# Patient Record
Sex: MALE | Race: WHITE | NOT HISPANIC OR LATINO
[De-identification: names, ages, dates, MRNs, and addresses within clinical notes are randomized per-mention and may not be internally consistent; named-entity substitution may affect disease eponyms.]

---

## 2017-01-17 ENCOUNTER — APPOINTMENT (OUTPATIENT)
Dept: SURGICAL ONCOLOGY | Facility: CLINIC | Age: 67
End: 2017-01-17

## 2017-01-17 VITALS
DIASTOLIC BLOOD PRESSURE: 80 MMHG | RESPIRATION RATE: 18 BRPM | SYSTOLIC BLOOD PRESSURE: 120 MMHG | BODY MASS INDEX: 24.59 KG/M2 | WEIGHT: 166 LBS | HEIGHT: 69 IN | HEART RATE: 90 BPM

## 2018-04-19 ENCOUNTER — APPOINTMENT (OUTPATIENT)
Dept: CARDIOLOGY | Facility: CLINIC | Age: 68
End: 2018-04-19
Payer: COMMERCIAL

## 2018-04-19 VITALS
SYSTOLIC BLOOD PRESSURE: 122 MMHG | HEART RATE: 62 BPM | BODY MASS INDEX: 23.85 KG/M2 | HEIGHT: 69 IN | WEIGHT: 161 LBS | RESPIRATION RATE: 16 BRPM | DIASTOLIC BLOOD PRESSURE: 80 MMHG

## 2018-04-19 PROCEDURE — 93010 ELECTROCARDIOGRAM REPORT: CPT

## 2018-04-19 PROCEDURE — 99213 OFFICE O/P EST LOW 20 MIN: CPT

## 2018-04-24 ENCOUNTER — RX RENEWAL (OUTPATIENT)
Age: 68
End: 2018-04-24

## 2018-06-05 ENCOUNTER — RX RENEWAL (OUTPATIENT)
Age: 68
End: 2018-06-05

## 2018-09-27 ENCOUNTER — APPOINTMENT (OUTPATIENT)
Dept: CARDIOLOGY | Facility: CLINIC | Age: 68
End: 2018-09-27
Payer: COMMERCIAL

## 2018-09-27 VITALS
BODY MASS INDEX: 24.44 KG/M2 | WEIGHT: 165 LBS | HEART RATE: 68 BPM | HEIGHT: 69 IN | SYSTOLIC BLOOD PRESSURE: 127 MMHG | DIASTOLIC BLOOD PRESSURE: 82 MMHG

## 2018-09-27 PROCEDURE — 99213 OFFICE O/P EST LOW 20 MIN: CPT

## 2018-09-27 PROCEDURE — 93000 ELECTROCARDIOGRAM COMPLETE: CPT

## 2018-10-24 ENCOUNTER — RX RENEWAL (OUTPATIENT)
Age: 68
End: 2018-10-24

## 2018-12-03 ENCOUNTER — RX RENEWAL (OUTPATIENT)
Age: 68
End: 2018-12-03

## 2019-04-22 ENCOUNTER — RX RENEWAL (OUTPATIENT)
Age: 69
End: 2019-04-22

## 2019-04-25 ENCOUNTER — APPOINTMENT (OUTPATIENT)
Dept: CARDIOLOGY | Facility: CLINIC | Age: 69
End: 2019-04-25
Payer: COMMERCIAL

## 2019-04-25 VITALS
DIASTOLIC BLOOD PRESSURE: 70 MMHG | BODY MASS INDEX: 25.18 KG/M2 | RESPIRATION RATE: 15 BRPM | WEIGHT: 170 LBS | SYSTOLIC BLOOD PRESSURE: 118 MMHG | HEART RATE: 65 BPM | HEIGHT: 69 IN

## 2019-04-25 PROCEDURE — 99213 OFFICE O/P EST LOW 20 MIN: CPT

## 2019-07-22 ENCOUNTER — RX RENEWAL (OUTPATIENT)
Age: 69
End: 2019-07-22

## 2019-09-10 ENCOUNTER — RX RENEWAL (OUTPATIENT)
Age: 69
End: 2019-09-10

## 2019-09-23 ENCOUNTER — APPOINTMENT (OUTPATIENT)
Dept: CARDIOLOGY | Facility: CLINIC | Age: 69
End: 2019-09-23
Payer: COMMERCIAL

## 2019-09-23 VITALS
RESPIRATION RATE: 15 BRPM | HEART RATE: 70 BPM | BODY MASS INDEX: 25.33 KG/M2 | DIASTOLIC BLOOD PRESSURE: 82 MMHG | WEIGHT: 171 LBS | HEIGHT: 69 IN | SYSTOLIC BLOOD PRESSURE: 129 MMHG

## 2019-09-23 PROCEDURE — 93015 CV STRESS TEST SUPVJ I&R: CPT

## 2019-09-23 PROCEDURE — ZZZZZ: CPT

## 2019-09-23 PROCEDURE — 93306 TTE W/DOPPLER COMPLETE: CPT

## 2019-09-23 PROCEDURE — 93880 EXTRACRANIAL BILAT STUDY: CPT

## 2019-09-23 PROCEDURE — 99213 OFFICE O/P EST LOW 20 MIN: CPT | Mod: 25

## 2019-10-22 ENCOUNTER — RX RENEWAL (OUTPATIENT)
Age: 69
End: 2019-10-22

## 2019-10-25 ENCOUNTER — RX RENEWAL (OUTPATIENT)
Age: 69
End: 2019-10-25

## 2020-01-09 ENCOUNTER — APPOINTMENT (OUTPATIENT)
Dept: CARDIOLOGY | Facility: CLINIC | Age: 70
End: 2020-01-09
Payer: COMMERCIAL

## 2020-01-09 VITALS
SYSTOLIC BLOOD PRESSURE: 126 MMHG | HEIGHT: 69 IN | WEIGHT: 174 LBS | BODY MASS INDEX: 25.77 KG/M2 | DIASTOLIC BLOOD PRESSURE: 80 MMHG | RESPIRATION RATE: 15 BRPM | HEART RATE: 70 BPM

## 2020-01-09 PROCEDURE — 99213 OFFICE O/P EST LOW 20 MIN: CPT

## 2020-08-27 ENCOUNTER — APPOINTMENT (OUTPATIENT)
Dept: CARDIOLOGY | Facility: CLINIC | Age: 70
End: 2020-08-27
Payer: COMMERCIAL

## 2020-08-27 VITALS
RESPIRATION RATE: 15 BRPM | HEART RATE: 95 BPM | SYSTOLIC BLOOD PRESSURE: 128 MMHG | BODY MASS INDEX: 25.92 KG/M2 | HEIGHT: 69 IN | DIASTOLIC BLOOD PRESSURE: 82 MMHG | WEIGHT: 175 LBS

## 2020-08-27 PROCEDURE — 99213 OFFICE O/P EST LOW 20 MIN: CPT

## 2020-08-27 PROCEDURE — 93000 ELECTROCARDIOGRAM COMPLETE: CPT

## 2020-12-03 ENCOUNTER — APPOINTMENT (OUTPATIENT)
Dept: CARDIOLOGY | Facility: CLINIC | Age: 70
End: 2020-12-03
Payer: COMMERCIAL

## 2020-12-03 VITALS
BODY MASS INDEX: 24.88 KG/M2 | HEIGHT: 69 IN | SYSTOLIC BLOOD PRESSURE: 126 MMHG | DIASTOLIC BLOOD PRESSURE: 80 MMHG | HEART RATE: 70 BPM | WEIGHT: 168 LBS | RESPIRATION RATE: 15 BRPM

## 2020-12-03 PROCEDURE — 99072 ADDL SUPL MATRL&STAF TM PHE: CPT

## 2020-12-03 PROCEDURE — 99213 OFFICE O/P EST LOW 20 MIN: CPT

## 2021-05-13 ENCOUNTER — APPOINTMENT (OUTPATIENT)
Dept: CARDIOLOGY | Facility: CLINIC | Age: 71
End: 2021-05-13
Payer: COMMERCIAL

## 2021-05-13 VITALS
BODY MASS INDEX: 24.59 KG/M2 | HEIGHT: 69 IN | RESPIRATION RATE: 15 BRPM | WEIGHT: 166 LBS | HEART RATE: 94 BPM | SYSTOLIC BLOOD PRESSURE: 122 MMHG | DIASTOLIC BLOOD PRESSURE: 79 MMHG

## 2021-05-13 PROCEDURE — 99213 OFFICE O/P EST LOW 20 MIN: CPT

## 2021-05-13 PROCEDURE — 93000 ELECTROCARDIOGRAM COMPLETE: CPT

## 2021-05-13 PROCEDURE — 99072 ADDL SUPL MATRL&STAF TM PHE: CPT

## 2021-05-13 NOTE — ASSESSMENT
[FreeTextEntry1] : Pt is a 69-year-old male with history of mitral valve regurgitation, hypertension, renal insufficiency, and comes in for follow up cardiac evaluation. Pt is well at the current time. Pt denies chest pain, shortness of breath, dizziness, palpitations, or syncope.\par

## 2021-05-13 NOTE — DISCUSSION/SUMMARY
[FreeTextEntry1] : This is a 70-year-old male with history of mitral valve regurgitation, hypertension, chronic renal insufficiency, and comes in for cardiac evaluation. He denies chest pain, shortness of breath, dizziness or syncope.\par EKG done May 13, 2021 demonstrated normal sinus rhythm at 94 beats per minute is otherwise remarkable for left atrial abnormality.\par Blood work done April 10, 2011 Demonstrated cholesterol 135 mg/dL, triglyceride 88 mg/dL, HDL 67 mg/dL, LDL direct of 50 mg/dL.\par Creatinine 1.78, and GFR of 38 cc per minute.  He will followup with his nephrologist.\par The patient is instructed to you regarding his medical care.\par He will continue Micardis hydrochlorothiazide 80/25 mg in morning, Crestor 20 mg per day, Norvasc 5 mg in the evening.\par The patient had a normal exercise stress test September 23, 2019.\par The patient understands that aerobic exercises must be increased to 40 minutes 4 times per week. A detailed discussion of lifestyle modification was done today. The patient has a good understanding of the diagnosis, and treatment plan. Lifestyle modification was also outlined.

## 2021-05-13 NOTE — PHYSICAL EXAM
[Well Developed] : well developed [Well Nourished] : well nourished [No Acute Distress] : no acute distress [Normal Venous Pressure] : normal venous pressure [No Carotid Bruit] : no carotid bruit [Normal S1, S2] : normal S1, S2 [No Rub] : no rub [Murmur] : murmur [Rhythm Regular] : regular [I] : a grade 1 [Clear Lung Fields] : clear lung fields [Good Air Entry] : good air entry [No Respiratory Distress] : no respiratory distress  [Soft] : abdomen soft [Non Tender] : non-tender [No Masses/organomegaly] : no masses/organomegaly [Normal Bowel Sounds] : normal bowel sounds [Normal Gait] : normal gait [No Edema] : no edema [No Cyanosis] : no cyanosis [No Clubbing] : no clubbing [No Varicosities] : no varicosities [No Rash] : no rash [No Skin Lesions] : no skin lesions [Moves all extremities] : moves all extremities [No Focal Deficits] : no focal deficits [Normal Speech] : normal speech [Alert and Oriented] : alert and oriented [Normal memory] : normal memory [General Appearance - Well Developed] : well developed [Normal Appearance] : normal appearance [Well Groomed] : well groomed [General Appearance - Well Nourished] : well nourished [No Deformities] : no deformities [General Appearance - In No Acute Distress] : no acute distress [Normal Conjunctiva] : the conjunctiva exhibited no abnormalities [Normal Oral Mucosa] : normal oral mucosa [Normal Oropharynx] : normal oropharynx [Normal Jugular Venous A Waves Present] : normal jugular venous A waves present [Normal Jugular Venous V Waves Present] : normal jugular venous V waves present [No Jugular Venous Stubbs A Waves] : no jugular venous stubbs A waves [] : no respiratory distress [Respiration, Rhythm And Depth] : normal respiratory rhythm and effort [Exaggerated Use Of Accessory Muscles For Inspiration] : no accessory muscle use [Auscultation Breath Sounds / Voice Sounds] : lungs were clear to auscultation bilaterally [Bowel Sounds] : normal bowel sounds [Abdomen Soft] : soft [Abnormal Walk] : normal gait [Gait - Sufficient For Exercise Testing] : the gait was sufficient for exercise testing [Nail Clubbing] : no clubbing of the fingernails [Cyanosis, Localized] : no localized cyanosis [Skin Color & Pigmentation] : normal skin color and pigmentation [Oriented To Time, Place, And Person] : oriented to person, place, and time [Affect] : the affect was normal [Mood] : the mood was normal [No Anxiety] : not feeling anxious [5th Left ICS - MCL] : palpated at the 5th LICS in the midclavicular line [Normal] : normal [No Precordial Heave] : no precordial heave was noted [Normal Rate] : normal [Normal S1] : normal S1 [Normal S2] : normal S2 [No Gallop] : no gallop heard [II] : a grade 2 [2+] : left 2+ [No Abnormalities] : the abdominal aorta was not enlarged and no bruit was heard [No Pitting Edema] : no pitting edema present [Apical Thrill] : no thrill palpable at the apex [Distant] : the heart sounds were ~L not distant [FreeTextEntry1] : left bruit vs. transmitted murmur  [S3] : no S3 [S4] : no S4 [Click] : no click [Pericardial Rub] : no pericardial rub [Right Carotid Bruit] : no bruit heard over the right carotid [Left Carotid Bruit] : no bruit heard over the left carotid [Right Femoral Bruit] : no bruit heard over the right femoral artery [Left Femoral Bruit] : no bruit heard over the left femoral artery

## 2021-05-13 NOTE — REASON FOR VISIT
[CV Risk Factors and Non-Cardiac Disease] : CV risk factors and non-cardiac disease [Follow-Up - Clinic] : a clinic follow-up of [Coronary Artery Disease] : coronary artery disease [Hyperlipidemia] : hyperlipidemia [Hypertension] : hypertension [Mitral Regurgitation] : mitral regurgitation [FreeTextEntry1] : PVC's

## 2021-10-25 ENCOUNTER — LABORATORY RESULT (OUTPATIENT)
Age: 71
End: 2021-10-25

## 2021-10-26 ENCOUNTER — APPOINTMENT (OUTPATIENT)
Dept: CARDIOLOGY | Facility: CLINIC | Age: 71
End: 2021-10-26
Payer: COMMERCIAL

## 2021-10-26 VITALS
WEIGHT: 165 LBS | BODY MASS INDEX: 24.44 KG/M2 | HEIGHT: 69 IN | OXYGEN SATURATION: 99 % | TEMPERATURE: 98 F | RESPIRATION RATE: 15 BRPM | HEART RATE: 103 BPM | DIASTOLIC BLOOD PRESSURE: 86 MMHG | SYSTOLIC BLOOD PRESSURE: 124 MMHG

## 2021-10-26 PROCEDURE — 93015 CV STRESS TEST SUPVJ I&R: CPT

## 2021-10-26 PROCEDURE — 93306 TTE W/DOPPLER COMPLETE: CPT

## 2021-10-26 PROCEDURE — 99213 OFFICE O/P EST LOW 20 MIN: CPT | Mod: 25

## 2021-10-26 NOTE — PHYSICAL EXAM
[Well Developed] : well developed [Well Nourished] : well nourished [No Acute Distress] : no acute distress [Normal Venous Pressure] : normal venous pressure [No Carotid Bruit] : no carotid bruit [Normal S1, S2] : normal S1, S2 [No Rub] : no rub [Murmur] : murmur [Rhythm Regular] : regular [I] : a grade 1 [Clear Lung Fields] : clear lung fields [Good Air Entry] : good air entry [No Respiratory Distress] : no respiratory distress  [Soft] : abdomen soft [Non Tender] : non-tender [No Masses/organomegaly] : no masses/organomegaly [Normal Bowel Sounds] : normal bowel sounds [Normal Gait] : normal gait [No Edema] : no edema [No Cyanosis] : no cyanosis [No Clubbing] : no clubbing [No Varicosities] : no varicosities [No Rash] : no rash [No Skin Lesions] : no skin lesions [Moves all extremities] : moves all extremities [Normal Speech] : normal speech [No Focal Deficits] : no focal deficits [Alert and Oriented] : alert and oriented [Normal memory] : normal memory [General Appearance - Well Developed] : well developed [Normal Appearance] : normal appearance [Well Groomed] : well groomed [General Appearance - Well Nourished] : well nourished [No Deformities] : no deformities [General Appearance - In No Acute Distress] : no acute distress [Normal Conjunctiva] : the conjunctiva exhibited no abnormalities [Normal Oral Mucosa] : normal oral mucosa [Normal Oropharynx] : normal oropharynx [Normal Jugular Venous A Waves Present] : normal jugular venous A waves present [Normal Jugular Venous V Waves Present] : normal jugular venous V waves present [No Jugular Venous Stubbs A Waves] : no jugular venous stubbs A waves [] : no respiratory distress [Respiration, Rhythm And Depth] : normal respiratory rhythm and effort [Exaggerated Use Of Accessory Muscles For Inspiration] : no accessory muscle use [Bowel Sounds] : normal bowel sounds [Auscultation Breath Sounds / Voice Sounds] : lungs were clear to auscultation bilaterally [Abdomen Soft] : soft [Abnormal Walk] : normal gait [Gait - Sufficient For Exercise Testing] : the gait was sufficient for exercise testing [Nail Clubbing] : no clubbing of the fingernails [Cyanosis, Localized] : no localized cyanosis [Skin Color & Pigmentation] : normal skin color and pigmentation [Oriented To Time, Place, And Person] : oriented to person, place, and time [Affect] : the affect was normal [Mood] : the mood was normal [No Anxiety] : not feeling anxious [5th Left ICS - MCL] : palpated at the 5th LICS in the midclavicular line [Normal] : normal [No Precordial Heave] : no precordial heave was noted [Normal Rate] : normal [Normal S1] : normal S1 [Normal S2] : normal S2 [No Gallop] : no gallop heard [II] : a grade 2 [2+] : left 2+ [No Abnormalities] : the abdominal aorta was not enlarged and no bruit was heard [No Pitting Edema] : no pitting edema present [Apical Thrill] : no thrill palpable at the apex [FreeTextEntry1] : left bruit vs. transmitted murmur  [S3] : no S3 [S4] : no S4 [Click] : no click [Pericardial Rub] : no pericardial rub [Right Carotid Bruit] : no bruit heard over the right carotid [Left Carotid Bruit] : no bruit heard over the left carotid [Right Femoral Bruit] : no bruit heard over the right femoral artery [Left Femoral Bruit] : no bruit heard over the left femoral artery

## 2021-10-26 NOTE — DISCUSSION/SUMMARY
[FreeTextEntry1] : This is a 71-year-old male with history of mitral valve regurgitation, hypertension, chronic renal insufficiency, and comes in for cardiac evaluation. He denies chest pain, shortness of breath, dizziness or syncope.\par The patient had a normal exercise stress test October 26, 2021.\par He will have new blood work done today for lipid profile.\par He will follow up with his nephrologist.\par He has been stable on his Micardis hydrochlorothiazide 80/25 mg/day and Norvasc 5 mg/day and continues Crestor 20 mg/day.\par EKG done May 13, 2021 demonstrated normal sinus rhythm at 94 beats per minute is otherwise remarkable for left atrial abnormality.\par The patient had a normal exercise stress test September 23, 2019.\par The patient understands that aerobic exercises must be increased to 40 minutes 4 times per week. A detailed discussion of lifestyle modification was done today. The patient has a good understanding of the diagnosis, and treatment plan. Lifestyle modification was also outlined.

## 2022-03-03 ENCOUNTER — APPOINTMENT (OUTPATIENT)
Dept: CARDIOLOGY | Facility: CLINIC | Age: 72
End: 2022-03-03
Payer: COMMERCIAL

## 2022-03-03 VITALS
BODY MASS INDEX: 25.62 KG/M2 | DIASTOLIC BLOOD PRESSURE: 78 MMHG | WEIGHT: 173 LBS | TEMPERATURE: 97.5 F | SYSTOLIC BLOOD PRESSURE: 121 MMHG | HEART RATE: 74 BPM | OXYGEN SATURATION: 95 % | HEIGHT: 69 IN

## 2022-03-03 PROCEDURE — 99214 OFFICE O/P EST MOD 30 MIN: CPT

## 2022-03-03 PROCEDURE — 93000 ELECTROCARDIOGRAM COMPLETE: CPT

## 2022-03-03 NOTE — DISCUSSION/SUMMARY
[FreeTextEntry1] : This is a 71-year-old male with history of mitral valve regurgitation, hypertension, chronic renal insufficiency, and comes in for cardiac evaluation. He denies chest pain, shortness of breath, dizziness or syncope.\par Electrocardiogram done March 3, 2022 demonstrated normal sinus rhythm rate 74 beats per minute is otherwise remarkable for atrial abnormality.\par The patient's blood pressure is under good control.  Since he must followup with his nephrologist.  He will do blood work for lipid panel and SMA-20 in the next 2 months.\par He will remain on his current medications.  I reviewed the results of his last blood work, echo Doppler examination and stress test with him today.\par \par The patient had a normal exercise stress test October 26, 2021.\par He will have new blood work done today for lipid profile.\par He will follow up with his nephrologist.\par He has been stable on his Micardis hydrochlorothiazide 80/25 mg/day and Norvasc 5 mg/day and continues Crestor 20 mg/day.\par EKG done May 13, 2021 demonstrated normal sinus rhythm at 94 beats per minute is otherwise remarkable for left atrial abnormality.\par The patient had a normal exercise stress test September 23, 2019.\par The patient understands that aerobic exercises must be increased to 40 minutes 4 times per week. A detailed discussion of lifestyle modification was done today. The patient has a good understanding of the diagnosis, and treatment plan. Lifestyle modification was also outlined.

## 2022-03-03 NOTE — PHYSICAL EXAM
[Well Developed] : well developed [Well Nourished] : well nourished [No Acute Distress] : no acute distress [Normal Venous Pressure] : normal venous pressure [No Carotid Bruit] : no carotid bruit [Normal S1, S2] : normal S1, S2 [No Rub] : no rub [Murmur] : murmur [Rhythm Regular] : regular [I] : a grade 1 [Clear Lung Fields] : clear lung fields [Good Air Entry] : good air entry [No Respiratory Distress] : no respiratory distress  [Soft] : abdomen soft [Non Tender] : non-tender [No Masses/organomegaly] : no masses/organomegaly [Normal Bowel Sounds] : normal bowel sounds [Normal Gait] : normal gait [No Edema] : no edema [No Cyanosis] : no cyanosis [No Clubbing] : no clubbing [No Varicosities] : no varicosities [No Rash] : no rash [No Skin Lesions] : no skin lesions [Moves all extremities] : moves all extremities [No Focal Deficits] : no focal deficits [Normal Speech] : normal speech [Alert and Oriented] : alert and oriented [Normal memory] : normal memory [General Appearance - Well Developed] : well developed [Normal Appearance] : normal appearance [Well Groomed] : well groomed [General Appearance - Well Nourished] : well nourished [No Deformities] : no deformities [General Appearance - In No Acute Distress] : no acute distress [Normal Conjunctiva] : the conjunctiva exhibited no abnormalities [Normal Oral Mucosa] : normal oral mucosa [Normal Oropharynx] : normal oropharynx [Normal Jugular Venous A Waves Present] : normal jugular venous A waves present [Normal Jugular Venous V Waves Present] : normal jugular venous V waves present [No Jugular Venous Stubbs A Waves] : no jugular venous stubbs A waves [] : no respiratory distress [Respiration, Rhythm And Depth] : normal respiratory rhythm and effort [Exaggerated Use Of Accessory Muscles For Inspiration] : no accessory muscle use [Auscultation Breath Sounds / Voice Sounds] : lungs were clear to auscultation bilaterally [Bowel Sounds] : normal bowel sounds [Abdomen Soft] : soft [Abnormal Walk] : normal gait [Gait - Sufficient For Exercise Testing] : the gait was sufficient for exercise testing [Nail Clubbing] : no clubbing of the fingernails [Cyanosis, Localized] : no localized cyanosis [Skin Color & Pigmentation] : normal skin color and pigmentation [Oriented To Time, Place, And Person] : oriented to person, place, and time [Affect] : the affect was normal [Mood] : the mood was normal [No Anxiety] : not feeling anxious [5th Left ICS - MCL] : palpated at the 5th LICS in the midclavicular line [Normal] : normal [No Precordial Heave] : no precordial heave was noted [Normal Rate] : normal [Normal S1] : normal S1 [Normal S2] : normal S2 [No Gallop] : no gallop heard [II] : a grade 2 [2+] : left 2+ [No Abnormalities] : the abdominal aorta was not enlarged and no bruit was heard [No Pitting Edema] : no pitting edema present [Apical Thrill] : no thrill palpable at the apex [FreeTextEntry1] : left bruit vs. transmitted murmur  [S3] : no S3 [S4] : no S4 [Click] : no click [Pericardial Rub] : no pericardial rub [Right Carotid Bruit] : no bruit heard over the right carotid [Left Carotid Bruit] : no bruit heard over the left carotid [Right Femoral Bruit] : no bruit heard over the right femoral artery [Left Femoral Bruit] : no bruit heard over the left femoral artery

## 2022-07-12 ENCOUNTER — RX RENEWAL (OUTPATIENT)
Age: 72
End: 2022-07-12

## 2022-08-16 ENCOUNTER — RX RENEWAL (OUTPATIENT)
Age: 72
End: 2022-08-16

## 2022-09-22 ENCOUNTER — APPOINTMENT (OUTPATIENT)
Dept: CARDIOLOGY | Facility: CLINIC | Age: 72
End: 2022-09-22

## 2022-09-22 VITALS
BODY MASS INDEX: 24.59 KG/M2 | DIASTOLIC BLOOD PRESSURE: 72 MMHG | SYSTOLIC BLOOD PRESSURE: 118 MMHG | TEMPERATURE: 97.6 F | HEART RATE: 89 BPM | WEIGHT: 166 LBS | OXYGEN SATURATION: 98 % | HEIGHT: 69 IN

## 2022-09-22 PROCEDURE — 99214 OFFICE O/P EST MOD 30 MIN: CPT | Mod: 25

## 2022-09-22 PROCEDURE — 93000 ELECTROCARDIOGRAM COMPLETE: CPT

## 2022-09-22 NOTE — DISCUSSION/SUMMARY
[FreeTextEntry1] : This is a 72-year-old male with history of mitral valve regurgitation, hypertension, chronic renal insufficiency, and comes in for cardiac evaluation. He denies chest pain, shortness of breath, dizziness or syncope.\par Electrocardiogram done September 22, 2022 demonstrated normal sinus rhythm rate of 88 bpm is otherwise remarkable for right interventricular conduction delay.\par Blood work done August 1, 2022 demonstrated cholesterol 127, triglycerides of 70, HDL of 67, non-HDL cholesterol 60, LDL direct of 44 mg/dL with hemoglobin A1c of 5.8.  Creatinine 1.6, GFR of 41 cc/min.\par Patient has appointment to see his nephrologist in the next month.\par Electrocardiogram done March 3, 2022 demonstrated normal sinus rhythm rate 74 beats per minute is otherwise remarkable for atrial abnormality.\par \par He will remain on his current medications.  I reviewed the results of his last blood work, echo Doppler examination and stress test with him today.\par The patient had a normal exercise stress test October 26, 2021.\par EKG done May 13, 2021 demonstrated normal sinus rhythm at 94 beats per minute is otherwise remarkable for left atrial abnormality.\par The patient had a normal exercise stress test September 23, 2019.\par The patient understands that aerobic exercises must be increased to 40 minutes 4 times per week. A detailed discussion of lifestyle modification was done today. The patient has a good understanding of the diagnosis, and treatment plan. Lifestyle modification was also outlined.

## 2023-02-28 ENCOUNTER — RX RENEWAL (OUTPATIENT)
Age: 73
End: 2023-02-28

## 2023-03-16 ENCOUNTER — APPOINTMENT (OUTPATIENT)
Dept: CARDIOLOGY | Facility: CLINIC | Age: 73
End: 2023-03-16

## 2023-04-28 ENCOUNTER — NON-APPOINTMENT (OUTPATIENT)
Age: 73
End: 2023-04-28

## 2023-04-28 ENCOUNTER — APPOINTMENT (OUTPATIENT)
Dept: CARDIOLOGY | Facility: CLINIC | Age: 73
End: 2023-04-28
Payer: COMMERCIAL

## 2023-04-28 VITALS
OXYGEN SATURATION: 97 % | SYSTOLIC BLOOD PRESSURE: 118 MMHG | BODY MASS INDEX: 23.99 KG/M2 | HEIGHT: 69 IN | DIASTOLIC BLOOD PRESSURE: 80 MMHG | RESPIRATION RATE: 16 BRPM | HEART RATE: 87 BPM | TEMPERATURE: 96.4 F | WEIGHT: 162 LBS

## 2023-04-28 DIAGNOSIS — R09.89 OTHER SPECIFIED SYMPTOMS AND SIGNS INVOLVING THE CIRCULATORY AND RESPIRATORY SYSTEMS: ICD-10-CM

## 2023-04-28 PROCEDURE — 99214 OFFICE O/P EST MOD 30 MIN: CPT

## 2023-04-28 NOTE — DISCUSSION/SUMMARY
[FreeTextEntry1] : This is a 72-year-old male with history of mitral valve regurgitation, hypertension, chronic renal insufficiency, and comes in for cardiac evaluation. He denies chest pain, shortness of breath, dizziness or syncope.\par Electrocardiogram done September 22, 2022 demonstrated normal sinus rhythm rate of 88 bpm is otherwise remarkable for right interventricular conduction delay.\par Blood work done August 1, 2022 demonstrated cholesterol 127, triglycerides of 70, HDL of 67, non-HDL cholesterol 60, LDL direct of 44 mg/dL with hemoglobin A1c of 5.8.  Creatinine 1.6, GFR of 41 cc/min.\par Patient has appointment to see his nephrologist in the next month.\par Electrocardiogram done March 3, 2022 demonstrated normal sinus rhythm rate 74 beats per minute is otherwise remarkable for atrial abnormality.\par Blood work done April 8, 2023 demonstrated improvement in creatinine 1.52 (1.78 April 2021), and improvement of his GFR to 48 cc/min (was 38 cc/min April 10, 2021)\par The patient does have bilateral carotid bruits and I have asked him to schedule carotid Doppler examination.\par He may get occasional shortness of breath on exertion.  He will schedule an echo Doppler examination to evaluate his systolic and diastolic heart murmurs, left ventricular function, chamber size, and rule out hypertrophy.\par He will also schedule an exercise stress test to rule out significant coronary artery disease.\par The patient had a normal exercise stress test October 26, 2021.\par EKG done May 13, 2021 demonstrated normal sinus rhythm at 94 beats per minute is otherwise remarkable for left atrial abnormality.\par The patient had a normal exercise stress test September 23, 2019.\par The patient understands that aerobic exercises must be increased to 40 minutes 4 times per week. A detailed discussion of lifestyle modification was done today. The patient has a good understanding of the diagnosis, and treatment plan. Lifestyle modification was also outlined.

## 2023-04-28 NOTE — PHYSICAL EXAM
[Well Developed] : well developed [Well Nourished] : well nourished [No Acute Distress] : no acute distress [Normal Venous Pressure] : normal venous pressure [No Carotid Bruit] : no carotid bruit [Normal S1, S2] : normal S1, S2 [No Rub] : no rub [Murmur] : murmur [Rhythm Regular] : regular [I] : a grade 1 [Clear Lung Fields] : clear lung fields [Good Air Entry] : good air entry [No Respiratory Distress] : no respiratory distress  [Soft] : abdomen soft [No Masses/organomegaly] : no masses/organomegaly [Non Tender] : non-tender [Normal Bowel Sounds] : normal bowel sounds [Normal Gait] : normal gait [No Edema] : no edema [No Cyanosis] : no cyanosis [No Clubbing] : no clubbing [No Varicosities] : no varicosities [No Rash] : no rash [No Skin Lesions] : no skin lesions [Moves all extremities] : moves all extremities [No Focal Deficits] : no focal deficits [Normal Speech] : normal speech [Alert and Oriented] : alert and oriented Medical Necessity Clause: This procedure was medically necessary because the lesions that were treated were: [Normal memory] : normal memory Size Of Lesion (Optional): 0.5 [General Appearance - Well Developed] : well developed How Many Mls Were Removed From The 40 Mg/Ml (1ml) Vial When Preparing The Injectable Solution?: 0 [Normal Appearance] : normal appearance Kenalog Preparation: Kenalog [Well Groomed] : well groomed Lot # For Kenalog (Optional): [id number] [General Appearance - Well Nourished] : well nourished Ndc# For Kenalog Only: [id number] [No Deformities] : no deformities Validate Note Data When Using Inventory: Yes [General Appearance - In No Acute Distress] : no acute distress Concentration Of Kenalog Solution Injected (Mg/Ml): 10.0 [Normal Conjunctiva] : the conjunctiva exhibited no abnormalities Total Volume (Ccs): 0.4 [Normal Oropharynx] : normal oropharynx Include Z78.9 (Other Specified Conditions Influencing Health Status) As An Associated Diagnosis?: No [Normal Oral Mucosa] : normal oral mucosa Consent: The risks of atrophy were reviewed with the patient. [Normal Jugular Venous A Waves Present] : normal jugular venous A waves present Expiration Date For Kenalog (Optional): FEB 2024 [Normal Jugular Venous V Waves Present] : normal jugular venous V waves present Detail Level: Detailed [No Jugular Venous Stubbs A Waves] : no jugular venous stubbs A waves Show Inventory Tab: Hide [] : no respiratory distress Administered By (Optional): CANDI [Respiration, Rhythm And Depth] : normal respiratory rhythm and effort [Exaggerated Use Of Accessory Muscles For Inspiration] : no accessory muscle use [Auscultation Breath Sounds / Voice Sounds] : lungs were clear to auscultation bilaterally [Bowel Sounds] : normal bowel sounds [Abdomen Soft] : soft [Abnormal Walk] : normal gait [Gait - Sufficient For Exercise Testing] : the gait was sufficient for exercise testing [Nail Clubbing] : no clubbing of the fingernails [Cyanosis, Localized] : no localized cyanosis [Skin Color & Pigmentation] : normal skin color and pigmentation [Oriented To Time, Place, And Person] : oriented to person, place, and time [Affect] : the affect was normal [Mood] : the mood was normal [No Anxiety] : not feeling anxious [5th Left ICS - MCL] : palpated at the 5th LICS in the midclavicular line [Normal] : normal [No Precordial Heave] : no precordial heave was noted [Normal Rate] : normal [Normal S1] : normal S1 [Normal S2] : normal S2 [No Gallop] : no gallop heard [II] : a grade 2 [2+] : left 2+ [No Abnormalities] : the abdominal aorta was not enlarged and no bruit was heard [No Pitting Edema] : no pitting edema present [Apical Thrill] : no thrill palpable at the apex [FreeTextEntry1] : Bilateral carotid bruit vs. transmitted murmur  [S3] : no S3 [S4] : no S4 [Click] : no click [Pericardial Rub] : no pericardial rub [Right Carotid Bruit] : no bruit heard over the right carotid [Left Carotid Bruit] : no bruit heard over the left carotid [Right Femoral Bruit] : no bruit heard over the right femoral artery [Left Femoral Bruit] : no bruit heard over the left femoral artery

## 2023-05-25 ENCOUNTER — NON-APPOINTMENT (OUTPATIENT)
Age: 73
End: 2023-05-25

## 2023-07-24 ENCOUNTER — LABORATORY RESULT (OUTPATIENT)
Age: 73
End: 2023-07-24

## 2023-07-24 ENCOUNTER — APPOINTMENT (OUTPATIENT)
Dept: CARDIOLOGY | Facility: CLINIC | Age: 73
End: 2023-07-24
Payer: COMMERCIAL

## 2023-07-24 VITALS
DIASTOLIC BLOOD PRESSURE: 80 MMHG | WEIGHT: 162 LBS | OXYGEN SATURATION: 98 % | SYSTOLIC BLOOD PRESSURE: 119 MMHG | HEART RATE: 83 BPM | TEMPERATURE: 98.3 F | BODY MASS INDEX: 23.99 KG/M2 | RESPIRATION RATE: 16 BRPM | HEIGHT: 69 IN

## 2023-07-24 DIAGNOSIS — R06.09 OTHER FORMS OF DYSPNEA: ICD-10-CM

## 2023-07-24 DIAGNOSIS — I65.29 OCCLUSION AND STENOSIS OF UNSPECIFIED CAROTID ARTERY: ICD-10-CM

## 2023-07-24 DIAGNOSIS — R09.89 OTHER SPECIFIED SYMPTOMS AND SIGNS INVOLVING THE CIRCULATORY AND RESPIRATORY SYSTEMS: ICD-10-CM

## 2023-07-24 PROCEDURE — 93015 CV STRESS TEST SUPVJ I&R: CPT

## 2023-07-24 PROCEDURE — 93978 VASCULAR STUDY: CPT

## 2023-07-24 PROCEDURE — 93306 TTE W/DOPPLER COMPLETE: CPT

## 2023-07-24 PROCEDURE — 99213 OFFICE O/P EST LOW 20 MIN: CPT | Mod: 25

## 2023-07-24 PROCEDURE — 93880 EXTRACRANIAL BILAT STUDY: CPT

## 2023-07-24 NOTE — DISCUSSION/SUMMARY
[FreeTextEntry1] : This is a 72-year-old male with history of mitral valve regurgitation, hypertension, chronic renal insufficiency, and comes in for cardiac evaluation. He denies chest pain, shortness of breath, dizziness or syncope.\par The patient had a normal exercise stress test July 24, 2023\par He will follow-up with his nephrologist and primary care physician.\par He will have an echo Doppler examination July 24, 2023 to evaluate his left ventricular function, chamber size, murmur, and rule out hypertrophy.\par He will have an abdominal aortic aneurysm screen based on family history and possible bruit.\par Blood work will also be done today for lipid profile.\par Previous blood work done April 8, 2023 demonstrated potassium of 4.2, GFR 48 cc/min, cholesterol 136, triglycerides 104, HDL direct 71 mg/dL, and non-HDL cholesterol 65 mg/dL with LDL direct of 43 mg/dL.\par \par Electrocardiogram done September 22, 2022 demonstrated normal sinus rhythm rate of 88 bpm is otherwise remarkable for right interventricular conduction delay.\par Blood work done August 1, 2022 demonstrated cholesterol 127, triglycerides of 70, HDL of 67, non-HDL cholesterol 60, LDL direct of 44 mg/dL with hemoglobin A1c of 5.8.  Creatinine 1.6, GFR of 41 cc/min.\par Patient has appointment to see his nephrologist in the next month.\par Electrocardiogram done March 3, 2022 demonstrated normal sinus rhythm rate 74 beats per minute is otherwise remarkable for atrial abnormality.\par Blood work done April 8, 2023 demonstrated improvement in creatinine 1.52 (1.78 April 2021), and improvement of his GFR to 48 cc/min (was 38 cc/min April 10, 2021)\par The patient had a normal exercise stress test October 26, 2021.\par EKG done May 13, 2021 demonstrated normal sinus rhythm at 94 beats per minute is otherwise remarkable for left atrial abnormality.\par The patient had a normal exercise stress test September 23, 2019.\par The patient understands that aerobic exercises must be increased to 40 minutes 4 times per week. A detailed discussion of lifestyle modification was done today. The patient has a good understanding of the diagnosis, and treatment plan. Lifestyle modification was also outlined.

## 2023-07-24 NOTE — PHYSICAL EXAM
[Well Developed] : well developed [Well Nourished] : well nourished [No Acute Distress] : no acute distress [Normal Venous Pressure] : normal venous pressure [No Carotid Bruit] : no carotid bruit [Normal S1, S2] : normal S1, S2 [No Rub] : no rub [Murmur] : murmur [Apical Thrill] : no thrill palpable at the apex [Rhythm Regular] : regular [I] : a grade 1 [Clear Lung Fields] : clear lung fields [Good Air Entry] : good air entry [No Respiratory Distress] : no respiratory distress  [Soft] : abdomen soft [Non Tender] : non-tender [No Masses/organomegaly] : no masses/organomegaly [Normal Bowel Sounds] : normal bowel sounds [Normal Gait] : normal gait [No Edema] : no edema [No Cyanosis] : no cyanosis [No Clubbing] : no clubbing [No Varicosities] : no varicosities [No Rash] : no rash [No Skin Lesions] : no skin lesions [Moves all extremities] : moves all extremities [No Focal Deficits] : no focal deficits [Normal Speech] : normal speech [Alert and Oriented] : alert and oriented [Normal memory] : normal memory [General Appearance - Well Developed] : well developed [Normal Appearance] : normal appearance [Well Groomed] : well groomed [General Appearance - Well Nourished] : well nourished [No Deformities] : no deformities [General Appearance - In No Acute Distress] : no acute distress [Normal Conjunctiva] : the conjunctiva exhibited no abnormalities [Normal Oral Mucosa] : normal oral mucosa [Normal Oropharynx] : normal oropharynx [Normal Jugular Venous A Waves Present] : normal jugular venous A waves present [Normal Jugular Venous V Waves Present] : normal jugular venous V waves present [No Jugular Venous Stubbs A Waves] : no jugular venous stubbs A waves [FreeTextEntry1] : Bilateral carotid bruit vs. transmitted murmur  [] : no respiratory distress [Respiration, Rhythm And Depth] : normal respiratory rhythm and effort [Exaggerated Use Of Accessory Muscles For Inspiration] : no accessory muscle use [Auscultation Breath Sounds / Voice Sounds] : lungs were clear to auscultation bilaterally [Bowel Sounds] : normal bowel sounds [Abdomen Soft] : soft [Abnormal Walk] : normal gait [Gait - Sufficient For Exercise Testing] : the gait was sufficient for exercise testing [Nail Clubbing] : no clubbing of the fingernails [Cyanosis, Localized] : no localized cyanosis [Skin Color & Pigmentation] : normal skin color and pigmentation [Oriented To Time, Place, And Person] : oriented to person, place, and time [Affect] : the affect was normal [Mood] : the mood was normal [No Anxiety] : not feeling anxious [5th Left ICS - MCL] : palpated at the 5th LICS in the midclavicular line [Normal] : normal [No Precordial Heave] : no precordial heave was noted [Normal Rate] : normal [Normal S1] : normal S1 [Normal S2] : normal S2 [No Gallop] : no gallop heard [S3] : no S3 [S4] : no S4 [Click] : no click [Pericardial Rub] : no pericardial rub [II] : a grade 2 [Right Carotid Bruit] : no bruit heard over the right carotid [Left Carotid Bruit] : no bruit heard over the left carotid [Right Femoral Bruit] : no bruit heard over the right femoral artery [Left Femoral Bruit] : no bruit heard over the left femoral artery [2+] : left 2+ [No Abnormalities] : the abdominal aorta was not enlarged and no bruit was heard [No Pitting Edema] : no pitting edema present

## 2023-08-25 PROBLEM — R09.89 ABDOMINAL BRUIT: Status: ACTIVE | Noted: 2023-07-24

## 2023-08-25 PROBLEM — I65.29 CAROTID ARTERY PLAQUE: Status: ACTIVE | Noted: 2018-04-19

## 2023-08-25 PROBLEM — R06.09 DYSPNEA ON EXERTION: Status: ACTIVE | Noted: 2019-09-23

## 2023-10-18 ENCOUNTER — APPOINTMENT (OUTPATIENT)
Dept: CARDIOLOGY | Facility: CLINIC | Age: 73
End: 2023-10-18

## 2023-11-06 ENCOUNTER — RX RENEWAL (OUTPATIENT)
Age: 73
End: 2023-11-06

## 2023-12-29 ENCOUNTER — APPOINTMENT (OUTPATIENT)
Dept: CARDIOLOGY | Facility: CLINIC | Age: 73
End: 2023-12-29

## 2024-01-17 ENCOUNTER — LABORATORY RESULT (OUTPATIENT)
Age: 74
End: 2024-01-17

## 2024-01-17 ENCOUNTER — APPOINTMENT (OUTPATIENT)
Dept: CARDIOLOGY | Facility: CLINIC | Age: 74
End: 2024-01-17
Payer: COMMERCIAL

## 2024-01-17 ENCOUNTER — NON-APPOINTMENT (OUTPATIENT)
Age: 74
End: 2024-01-17

## 2024-01-17 VITALS
DIASTOLIC BLOOD PRESSURE: 84 MMHG | HEART RATE: 77 BPM | WEIGHT: 162 LBS | OXYGEN SATURATION: 97 % | RESPIRATION RATE: 16 BRPM | HEIGHT: 69 IN | TEMPERATURE: 98.6 F | BODY MASS INDEX: 23.99 KG/M2 | SYSTOLIC BLOOD PRESSURE: 122 MMHG

## 2024-01-17 DIAGNOSIS — E78.5 HYPERLIPIDEMIA, UNSPECIFIED: ICD-10-CM

## 2024-01-17 DIAGNOSIS — Z86.79 PERSONAL HISTORY OF OTHER DISEASES OF THE CIRCULATORY SYSTEM: ICD-10-CM

## 2024-01-17 DIAGNOSIS — I34.0 NONRHEUMATIC MITRAL (VALVE) INSUFFICIENCY: ICD-10-CM

## 2024-01-17 DIAGNOSIS — N18.30 CHRONIC KIDNEY DISEASE, STAGE 3 UNSPECIFIED: ICD-10-CM

## 2024-01-17 DIAGNOSIS — I49.3 VENTRICULAR PREMATURE DEPOLARIZATION: ICD-10-CM

## 2024-01-17 DIAGNOSIS — I07.1 RHEUMATIC TRICUSPID INSUFFICIENCY: ICD-10-CM

## 2024-01-17 DIAGNOSIS — I10 ESSENTIAL (PRIMARY) HYPERTENSION: ICD-10-CM

## 2024-01-17 PROCEDURE — 93000 ELECTROCARDIOGRAM COMPLETE: CPT

## 2024-01-17 PROCEDURE — 99214 OFFICE O/P EST MOD 30 MIN: CPT | Mod: 25

## 2024-01-17 NOTE — PHYSICAL EXAM
[Well Developed] : well developed [Well Nourished] : well nourished [No Acute Distress] : no acute distress [Normal Venous Pressure] : normal venous pressure [No Carotid Bruit] : no carotid bruit [Normal S1, S2] : normal S1, S2 [No Rub] : no rub [Murmur] : murmur [Rhythm Regular] : regular [I] : a grade 1 [Clear Lung Fields] : clear lung fields [Good Air Entry] : good air entry [No Respiratory Distress] : no respiratory distress  [Soft] : abdomen soft [Non Tender] : non-tender [No Masses/organomegaly] : no masses/organomegaly [Normal Bowel Sounds] : normal bowel sounds [Normal Gait] : normal gait [No Edema] : no edema [No Cyanosis] : no cyanosis [No Clubbing] : no clubbing [No Varicosities] : no varicosities [No Rash] : no rash [No Skin Lesions] : no skin lesions [Moves all extremities] : moves all extremities [No Focal Deficits] : no focal deficits [Normal Speech] : normal speech [Alert and Oriented] : alert and oriented [Normal memory] : normal memory [General Appearance - Well Developed] : well developed [Normal Appearance] : normal appearance [Well Groomed] : well groomed [General Appearance - Well Nourished] : well nourished [No Deformities] : no deformities [General Appearance - In No Acute Distress] : no acute distress [Normal Conjunctiva] : the conjunctiva exhibited no abnormalities [Normal Oral Mucosa] : normal oral mucosa [Normal Oropharynx] : normal oropharynx [Normal Jugular Venous A Waves Present] : normal jugular venous A waves present [Normal Jugular Venous V Waves Present] : normal jugular venous V waves present [No Jugular Venous Stubbs A Waves] : no jugular venous stubbs A waves [] : no respiratory distress [Respiration, Rhythm And Depth] : normal respiratory rhythm and effort [Exaggerated Use Of Accessory Muscles For Inspiration] : no accessory muscle use [Auscultation Breath Sounds / Voice Sounds] : lungs were clear to auscultation bilaterally [Bowel Sounds] : normal bowel sounds [Abdomen Soft] : soft [Abnormal Walk] : normal gait [Gait - Sufficient For Exercise Testing] : the gait was sufficient for exercise testing [Nail Clubbing] : no clubbing of the fingernails [Cyanosis, Localized] : no localized cyanosis [Skin Color & Pigmentation] : normal skin color and pigmentation [Oriented To Time, Place, And Person] : oriented to person, place, and time [Affect] : the affect was normal [Mood] : the mood was normal [No Anxiety] : not feeling anxious [5th Left ICS - MCL] : palpated at the 5th LICS in the midclavicular line [Normal] : normal [No Precordial Heave] : no precordial heave was noted [Normal Rate] : normal [Normal S1] : normal S1 [Normal S2] : normal S2 [No Gallop] : no gallop heard [II] : a grade 2 [2+] : left 2+ [No Abnormalities] : the abdominal aorta was not enlarged and no bruit was heard [No Pitting Edema] : no pitting edema present [Apical Thrill] : no thrill palpable at the apex [FreeTextEntry1] : Bilateral carotid bruit vs. transmitted murmur  [S3] : no S3 [S4] : no S4 [Click] : no click [Pericardial Rub] : no pericardial rub [Right Carotid Bruit] : no bruit heard over the right carotid [Left Carotid Bruit] : no bruit heard over the left carotid [Right Femoral Bruit] : no bruit heard over the right femoral artery [Left Femoral Bruit] : no bruit heard over the left femoral artery

## 2024-01-17 NOTE — DISCUSSION/SUMMARY
[FreeTextEntry1] : Dr. Sánchez-(PRIOR VISIT and PMH WITH Dr. Sánchez):  This is a 72-year-old male with history of mitral valve regurgitation, hypertension, chronic renal insufficiency, and comes in for cardiac evaluation. He denies chest pain, shortness of breath, dizziness or syncope. The patient had a normal exercise stress test July 24, 2023 He will follow-up with his nephrologist and primary care physician. He will have an echo Doppler examination July 24, 2023 to evaluate his left ventricular function, chamber size, murmur, and rule out hypertrophy. He will have an abdominal aortic aneurysm screen based on family history and possible bruit. Blood work will also be done today for lipid profile. Previous blood work done April 8, 2023 demonstrated potassium of 4.2, GFR 48 cc/min, cholesterol 136, triglycerides 104, HDL direct 71 mg/dL, and non-HDL cholesterol 65 mg/dL with LDL direct of 43 mg/dL.  Electrocardiogram done September 22, 2022 demonstrated normal sinus rhythm rate of 88 bpm is otherwise remarkable for right interventricular conduction delay. Blood work done August 1, 2022 demonstrated cholesterol 127, triglycerides of 70, HDL of 67, non-HDL cholesterol 60, LDL direct of 44 mg/dL with hemoglobin A1c of 5.8.  Creatinine 1.6, GFR of 41 cc/min. Patient has appointment to see his nephrologist in the next month. Electrocardiogram done March 3, 2022 demonstrated normal sinus rhythm rate 74 beats per minute is otherwise remarkable for atrial abnormality. Blood work done April 8, 2023 demonstrated improvement in creatinine 1.52 (1.78 April 2021), and improvement of his GFR to 48 cc/min (was 38 cc/min April 10, 2021) The patient had a normal exercise stress test October 26, 2021. EKG done May 13, 2021 demonstrated normal sinus rhythm at 94 beats per minute is otherwise remarkable for left atrial abnormality. The patient had a normal exercise stress test September 23, 2019. The patient understands that aerobic exercises must be increased to 40 minutes 4 times per week. A detailed discussion of lifestyle modification was done today. The patient has a good understanding of the diagnosis, and treatment plan. Lifestyle modification was also outlined.

## 2024-01-17 NOTE — ASSESSMENT
[FreeTextEntry1] : This is a 73-year-old male with history of mitral valve regurgitation, hypertension, chronic renal insufficiency, and comes in for cardiac evaluation.  HPI: He is feeling generally well today and denies chest pain, dizziness, heart palpitations, recent episodes of syncope or falls, SOB, or dyspnea at this time.  Current Medications: Amlodipine 5 mg daily, Telmisartan-HCTZ 80-25 mg daily, and Rosuvastatin 20 mg daily.   He follows up with his primary care provider at [facility].    BLOOD PRESSURE: Controlled.    BLOOD WORK: -New blood work was done 01/17/2024 to evaluate lipid profile, CBC, BMP, hepatic function, A1C and TSH. -Lipid panel done July 2023 demonstrated triglycerides 66, cholesterol 163, HDL 93, LDL 57, Non-HDL 70, LDL direct 57.  -Blood work done April 8, 2023 demonstrated potassium of 4.2, GFR 48 cc/min, cholesterol 136, triglycerides 104, HDL direct 71 mg/dL, and non-HDL cholesterol 65 mg/dL with LDL direct of 43 mg/dL. -Blood work done August 1, 2022 demonstrated cholesterol 127, triglycerides of 70, HDL of 67, non-HDL cholesterol 60, LDL direct of 44 mg/dL with hemoglobin A1c of 5.8.  Creatinine 1.6, GFR of 41 cc/min.   TESTING/REPORTS: -EKG done 01/17/2024 demonstrated normal sinus rhythm rate 84 bpm otherwise remarkable for left atrial abnormality and non-specific ST-T wave changes.   -Echocardiogram done July 2023 demonstrated normal left ventricular systolic function EF 65%, mild left ventricular hypertrophy, minimal aortic regurgitation, minimal aortic regurgitation, mild tricuspid regurgitation, trace pulmonic regurgitation.  -The patient had a normal exercise stress test July 24, 2023  -EKG done April 2023 demonstrated normal sinus rhythm rate 87 bpm otherwise remarkable for left atrial enlargement.   -Electrocardiogram done September 22, 2022 demonstrated normal sinus rhythm rate of 88 bpm is otherwise remarkable for right interventricular conduction delay.  -Electrocardiogram done March 3, 2022 demonstrated normal sinus rhythm rate 74 beats per minute is otherwise remarkable for atrial abnormality.  -The patient had a normal exercise stress test October 26, 2021.   PLAN: -He will continue his current medications and contact the office if problems arise before next follow-up appointment. -Patient will schedule echocardiogram doppler examination to evaluate murmur, left ventricular function, chamber size, and rule out hypertrophy. -He will schedule an exercise stress test to evaluate for significant coronary artery disease. -He will follow-up with his PCP as needed.   I have discussed the plan of care with JOY FINCH and he will follow up in 3-4 months. They are compliant with all of their medications.   The patient understands that aerobic exercises must be increased to 40 minutes 4 times/week and a detailed discussion of lifestyle modification was done today.   The patient has a good understanding of the diagnosis, treatment plan and lifestyle modification.   They will contact me at the office for any questions with their care or any changes in their health status.   The patient was discussed with supervision physician Dr. Seth Sánchez at the time of the visit and the plan of care will be carried out as noted above.     NELY Mendieta NP

## 2024-02-23 NOTE — REVIEW OF SYSTEMS
[address] [address]  Dept: [phone]  Loc: [phone]    Visit Date: 10/19/2023        HPI:     HPI  Mr. Yessenia Walton is a 59-year-old male that was seen in our office for kidney stones. His most recent stone was in 3/2023 with his previous stone being 10-15 years prior. His CT scan in 3/2023 was remarkable for a 4 mm stone in the distal left ureter causing mild left hydronephrosis. There is also a 7 mm non obstructing stone in the lower pole of the left kidney. He was able to spontaneously pass this stone. He presents today to review surveillance imaging. Current Outpatient Medications   Medication Sig Dispense Refill    tamsulosin (FLOMAX) 0.4 MG capsule Take 1 capsule by mouth daily To help the kidney stone to pass 30 capsule 0    simvastatin (ZOCOR) 40 MG tablet Take 1 tablet by mouth nightly      Blood Pressure Monitoring (BLOOD PRESSURE UNIT) MISC by Does not apply route      ketorolac (TORADOL) 10 MG tablet Take 1 tablet by mouth every 6 hours as needed for Pain 12 tablet 0     No current facility-administered medications for this visit. Past Medical History  [place] Alexander  has no past medical history on file. Past Surgical History  The patient  has no past surgical history on file. Family History  This patient's family history is not on file. Social History  Chung  reports that he has never smoked. He has never used smokeless tobacco.      Subjective:      Review of Systems   Constitutional:  Negative for chills and fever. HENT:  Negative for congestion, drooling and ear discharge. Eyes:  Negative for discharge. Respiratory:  Negative for cough. Gastrointestinal:  Negative for vomiting. Musculoskeletal:  Negative for gait problem. Skin:  Negative for color change and pallor. Allergic/Immunologic: Negative for environmental allergies and food allergies.    Psychiatric/Behavioral: PST [Negative] : Heme/Lymph

## 2024-05-02 ENCOUNTER — RX RENEWAL (OUTPATIENT)
Age: 74
End: 2024-05-02

## 2024-05-02 RX ORDER — ROSUVASTATIN CALCIUM 20 MG/1
20 TABLET, FILM COATED ORAL
Qty: 90 | Refills: 1 | Status: ACTIVE | COMMUNITY
Start: 2020-08-27 | End: 1900-01-01

## 2024-05-28 ENCOUNTER — RX RENEWAL (OUTPATIENT)
Age: 74
End: 2024-05-28

## 2024-05-28 RX ORDER — TELMISARTAN AND HYDROCHLOROTHIAZIDE 80; 25 MG/1; MG/1
80-25 TABLET ORAL
Qty: 90 | Refills: 1 | Status: ACTIVE | COMMUNITY
Start: 2019-10-25 | End: 1900-01-01

## 2024-07-19 ENCOUNTER — APPOINTMENT (OUTPATIENT)
Dept: CARDIOLOGY | Facility: CLINIC | Age: 74
End: 2024-07-19

## 2024-09-26 ENCOUNTER — LABORATORY RESULT (OUTPATIENT)
Age: 74
End: 2024-09-26

## 2024-09-26 ENCOUNTER — APPOINTMENT (OUTPATIENT)
Dept: CARDIOLOGY | Facility: CLINIC | Age: 74
End: 2024-09-26
Payer: MEDICARE

## 2024-09-26 VITALS
HEART RATE: 77 BPM | TEMPERATURE: 98.1 F | HEIGHT: 69 IN | DIASTOLIC BLOOD PRESSURE: 82 MMHG | RESPIRATION RATE: 16 BRPM | SYSTOLIC BLOOD PRESSURE: 125 MMHG | BODY MASS INDEX: 23.99 KG/M2 | OXYGEN SATURATION: 98 % | WEIGHT: 162 LBS

## 2024-09-26 DIAGNOSIS — I34.0 NONRHEUMATIC MITRAL (VALVE) INSUFFICIENCY: ICD-10-CM

## 2024-09-26 DIAGNOSIS — N18.30 CHRONIC KIDNEY DISEASE, STAGE 3 UNSPECIFIED: ICD-10-CM

## 2024-09-26 DIAGNOSIS — R06.09 OTHER FORMS OF DYSPNEA: ICD-10-CM

## 2024-09-26 DIAGNOSIS — I10 ESSENTIAL (PRIMARY) HYPERTENSION: ICD-10-CM

## 2024-09-26 DIAGNOSIS — E78.5 HYPERLIPIDEMIA, UNSPECIFIED: ICD-10-CM

## 2024-09-26 PROCEDURE — 93306 TTE W/DOPPLER COMPLETE: CPT

## 2024-09-26 PROCEDURE — 93015 CV STRESS TEST SUPVJ I&R: CPT

## 2024-09-26 PROCEDURE — 99203 OFFICE O/P NEW LOW 30 MIN: CPT | Mod: 25

## 2024-09-26 NOTE — DISCUSSION/SUMMARY
[FreeTextEntry1] : This is a 74-year-old male with history of mitral valve regurgitation, hypertension, chronic renal insufficiency, and comes in for cardiac evaluation.  He denies chest pain, shortness of breath, dizziness or syncope. The patient had a normal exercise stress test September 26, 2024. Lipid panel done January 17, 2024 demonstrated cholesterol 157, HDL 78, triglycerides 87, LDL calculated 63, non-HDL cholesterol 78, LDL direct 60 mg/dL with hemoglobin A1c of 5.9. The patient will be following up with his nephrologist, Dr. Issa, later in September 2024. Echo Doppler examination done July 4, 2023 demonstrated satisfactory left ventricular function with estimated ejection fraction of 65%, mild left ventricular perjury free, minimal mitral valve regurgitation, minimal aortic valve regurgitation, mild tricuspid valve regurgitation trace pulmonic valve regurgitation. The patient had a normal exercise stress test July 24, 2023 He will follow-up with his nephrologist and primary care physician. He will have an abdominal aortic aneurysm screen based on family history and possible bruit. Blood work will also be done today for lipid profile. Previous blood work done April 8, 2023 demonstrated potassium of 4.2, GFR 48 cc/min, cholesterol 136, triglycerides 104, HDL direct 71 mg/dL, and non-HDL cholesterol 65 mg/dL with LDL direct of 43 mg/dL.  Electrocardiogram done September 22, 2022 demonstrated normal sinus rhythm rate of 88 bpm is otherwise remarkable for right interventricular conduction delay. Blood work done August 1, 2022 demonstrated cholesterol 127, triglycerides of 70, HDL of 67, non-HDL cholesterol 60, LDL direct of 44 mg/dL with hemoglobin A1c of 5.8.  Creatinine 1.6, GFR of 41 cc/min. Patient has appointment to see his nephrologist in the next month. Electrocardiogram done March 3, 2022 demonstrated normal sinus rhythm rate 74 beats per minute is otherwise remarkable for atrial abnormality. Blood work done April 8, 2023 demonstrated improvement in creatinine 1.52 (1.78 April 2021), and improvement of his GFR to 48 cc/min (was 38 cc/min April 10, 2021) The patient had a normal exercise stress test October 26, 2021. EKG done May 13, 2021 demonstrated normal sinus rhythm at 94 beats per minute is otherwise remarkable for left atrial abnormality. The patient had a normal exercise stress test September 23, 2019. The patient understands that aerobic exercises must be increased to 40 minutes 4 times per week. A detailed discussion of lifestyle modification was done today. The patient has a good understanding of the diagnosis, and treatment plan. Lifestyle modification was also outlined.

## 2024-09-26 NOTE — ASSESSMENT
[FreeTextEntry1] : Prior note nurse practitioner Gayatri January 17, 2024::  This is a 73-year-old male with history of mitral valve regurgitation, hypertension, chronic renal insufficiency, and comes in for cardiac evaluation.  HPI: He is feeling generally well today and denies chest pain, dizziness, heart palpitations, recent episodes of syncope or falls, SOB, or dyspnea at this time.  Current Medications: Amlodipine 5 mg daily, Telmisartan-HCTZ 80-25 mg daily, and Rosuvastatin 20 mg daily.   He follows up with his primary care provider at [facility].    BLOOD PRESSURE: Controlled.    BLOOD WORK: -New blood work was done 01/17/2024 to evaluate lipid profile, CBC, BMP, hepatic function, A1C and TSH. -Lipid panel done July 2023 demonstrated triglycerides 66, cholesterol 163, HDL 93, LDL 57, Non-HDL 70, LDL direct 57.  -Blood work done April 8, 2023 demonstrated potassium of 4.2, GFR 48 cc/min, cholesterol 136, triglycerides 104, HDL direct 71 mg/dL, and non-HDL cholesterol 65 mg/dL with LDL direct of 43 mg/dL. -Blood work done August 1, 2022 demonstrated cholesterol 127, triglycerides of 70, HDL of 67, non-HDL cholesterol 60, LDL direct of 44 mg/dL with hemoglobin A1c of 5.8.  Creatinine 1.6, GFR of 41 cc/min.   TESTING/REPORTS: -EKG done 01/17/2024 demonstrated normal sinus rhythm rate 84 bpm otherwise remarkable for left atrial abnormality and non-specific ST-T wave changes.   -Echocardiogram done July 2023 demonstrated normal left ventricular systolic function EF 65%, mild left ventricular hypertrophy, minimal aortic regurgitation, minimal aortic regurgitation, mild tricuspid regurgitation, trace pulmonic regurgitation.  -The patient had a normal exercise stress test July 24, 2023  -EKG done April 2023 demonstrated normal sinus rhythm rate 87 bpm otherwise remarkable for left atrial enlargement.   -Electrocardiogram done September 22, 2022 demonstrated normal sinus rhythm rate of 88 bpm is otherwise remarkable for right interventricular conduction delay.  -Electrocardiogram done March 3, 2022 demonstrated normal sinus rhythm rate 74 beats per minute is otherwise remarkable for atrial abnormality.  -The patient had a normal exercise stress test October 26, 2021.   PLAN: -He will continue his current medications and contact the office if problems arise before next follow-up appointment. -Patient will schedule echocardiogram doppler examination to evaluate murmur, left ventricular function, chamber size, and rule out hypertrophy. -He will schedule an exercise stress test to evaluate for significant coronary artery disease. -He will follow-up with his PCP as needed.   I have discussed the plan of care with JOY FINCH and he will follow up in 3-4 months. They are compliant with all of their medications.   The patient understands that aerobic exercises must be increased to 40 minutes 4 times/week and a detailed discussion of lifestyle modification was done today.   The patient has a good understanding of the diagnosis, treatment plan and lifestyle modification.   They will contact me at the office for any questions with their care or any changes in their health status.   The patient was discussed with supervision physician Dr. Seth Sánchez at the time of the visit and the plan of care will be carried out as noted above.     NELY Mendieta NP

## 2024-10-24 ENCOUNTER — RX RENEWAL (OUTPATIENT)
Age: 74
End: 2024-10-24

## 2024-11-15 ENCOUNTER — RX RENEWAL (OUTPATIENT)
Age: 74
End: 2024-11-15

## 2025-04-09 ENCOUNTER — LABORATORY RESULT (OUTPATIENT)
Age: 75
End: 2025-04-09

## 2025-04-09 ENCOUNTER — NON-APPOINTMENT (OUTPATIENT)
Age: 75
End: 2025-04-09

## 2025-04-09 ENCOUNTER — APPOINTMENT (OUTPATIENT)
Dept: CARDIOLOGY | Facility: CLINIC | Age: 75
End: 2025-04-09
Payer: MEDICARE

## 2025-04-09 VITALS
TEMPERATURE: 98.1 F | WEIGHT: 165 LBS | BODY MASS INDEX: 24.44 KG/M2 | HEIGHT: 69 IN | SYSTOLIC BLOOD PRESSURE: 127 MMHG | OXYGEN SATURATION: 97 % | RESPIRATION RATE: 16 BRPM | HEART RATE: 81 BPM | DIASTOLIC BLOOD PRESSURE: 81 MMHG

## 2025-04-09 DIAGNOSIS — N18.30 CHRONIC KIDNEY DISEASE, STAGE 3 UNSPECIFIED: ICD-10-CM

## 2025-04-09 DIAGNOSIS — I07.1 RHEUMATIC TRICUSPID INSUFFICIENCY: ICD-10-CM

## 2025-04-09 DIAGNOSIS — E78.5 HYPERLIPIDEMIA, UNSPECIFIED: ICD-10-CM

## 2025-04-09 DIAGNOSIS — I10 ESSENTIAL (PRIMARY) HYPERTENSION: ICD-10-CM

## 2025-04-09 PROCEDURE — 99214 OFFICE O/P EST MOD 30 MIN: CPT

## 2025-04-09 PROCEDURE — G2211 COMPLEX E/M VISIT ADD ON: CPT

## 2025-04-09 PROCEDURE — 93000 ELECTROCARDIOGRAM COMPLETE: CPT

## 2025-04-18 ENCOUNTER — RX RENEWAL (OUTPATIENT)
Age: 75
End: 2025-04-18

## 2025-05-12 ENCOUNTER — RX RENEWAL (OUTPATIENT)
Age: 75
End: 2025-05-12

## 2025-09-09 DIAGNOSIS — E78.5 HYPERLIPIDEMIA, UNSPECIFIED: ICD-10-CM

## 2025-09-09 DIAGNOSIS — I34.0 NONRHEUMATIC MITRAL (VALVE) INSUFFICIENCY: ICD-10-CM

## 2025-09-09 DIAGNOSIS — I65.29 OCCLUSION AND STENOSIS OF UNSPECIFIED CAROTID ARTERY: ICD-10-CM

## 2025-09-09 DIAGNOSIS — I07.1 RHEUMATIC TRICUSPID INSUFFICIENCY: ICD-10-CM

## 2025-09-09 DIAGNOSIS — R06.09 OTHER FORMS OF DYSPNEA: ICD-10-CM
